# Patient Record
Sex: MALE | Race: WHITE | NOT HISPANIC OR LATINO | Employment: FULL TIME | URBAN - METROPOLITAN AREA
[De-identification: names, ages, dates, MRNs, and addresses within clinical notes are randomized per-mention and may not be internally consistent; named-entity substitution may affect disease eponyms.]

---
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
## 2021-01-19 ENCOUNTER — HOSPITAL ENCOUNTER (EMERGENCY)
Facility: HOSPITAL | Age: 21
Discharge: HOME | End: 2021-01-19
Attending: EMERGENCY MEDICINE
Payer: MEDICAID

## 2021-01-19 VITALS
HEIGHT: 74 IN | DIASTOLIC BLOOD PRESSURE: 80 MMHG | TEMPERATURE: 99 F | OXYGEN SATURATION: 99 % | WEIGHT: 205 LBS | RESPIRATION RATE: 14 BRPM | SYSTOLIC BLOOD PRESSURE: 139 MMHG | HEART RATE: 77 BPM | BODY MASS INDEX: 26.31 KG/M2

## 2021-01-19 DIAGNOSIS — J02.9 PHARYNGITIS, UNSPECIFIED ETIOLOGY: ICD-10-CM

## 2021-01-19 DIAGNOSIS — J35.8 SYMPTOMATIC TONSILLAR CRYPT: Primary | ICD-10-CM

## 2021-01-19 PROCEDURE — 99283 EMERGENCY DEPT VISIT LOW MDM: CPT | Mod: ER

## 2021-01-19 RX ORDER — AMOXICILLIN AND CLAVULANATE POTASSIUM 875; 125 MG/1; MG/1
1 TABLET, FILM COATED ORAL 2 TIMES DAILY
Qty: 14 TABLET | Refills: 0 | OUTPATIENT
Start: 2021-01-19 | End: 2024-02-23

## 2024-02-23 ENCOUNTER — HOSPITAL ENCOUNTER (EMERGENCY)
Facility: HOSPITAL | Age: 24
Discharge: HOME | End: 2024-02-23
Attending: EMERGENCY MEDICINE
Payer: COMMERCIAL

## 2024-02-23 VITALS
BODY MASS INDEX: 28.89 KG/M2 | RESPIRATION RATE: 20 BRPM | OXYGEN SATURATION: 100 % | HEART RATE: 68 BPM | TEMPERATURE: 98 F | DIASTOLIC BLOOD PRESSURE: 74 MMHG | SYSTOLIC BLOOD PRESSURE: 136 MMHG | WEIGHT: 225 LBS

## 2024-02-23 DIAGNOSIS — T14.8XXA PUNCTURE WOUND: Primary | ICD-10-CM

## 2024-02-23 PROCEDURE — 90715 TDAP VACCINE 7 YRS/> IM: CPT | Performed by: NURSE PRACTITIONER

## 2024-02-23 PROCEDURE — 63600175 PHARM REV CODE 636 W HCPCS: Performed by: NURSE PRACTITIONER

## 2024-02-23 PROCEDURE — 25000003 PHARM REV CODE 250: Performed by: NURSE PRACTITIONER

## 2024-02-23 PROCEDURE — 90471 IMMUNIZATION ADMIN: CPT | Performed by: NURSE PRACTITIONER

## 2024-02-23 PROCEDURE — 99284 EMERGENCY DEPT VISIT MOD MDM: CPT | Mod: 25

## 2024-02-23 RX ORDER — IBUPROFEN 600 MG/1
600 TABLET ORAL EVERY 6 HOURS PRN
Qty: 20 TABLET | Refills: 0 | Status: SHIPPED | OUTPATIENT
Start: 2024-02-23 | End: 2024-02-28

## 2024-02-23 RX ORDER — DEXTROMETHORPHAN HYDROBROMIDE, GUAIFENESIN 5; 100 MG/5ML; MG/5ML
650 LIQUID ORAL EVERY 8 HOURS
Qty: 20 TABLET | Refills: 0 | Status: SHIPPED | OUTPATIENT
Start: 2024-02-23 | End: 2024-03-01

## 2024-02-23 RX ORDER — MUPIROCIN 20 MG/G
OINTMENT TOPICAL 3 TIMES DAILY
Qty: 30 G | Refills: 0 | Status: SHIPPED | OUTPATIENT
Start: 2024-02-23 | End: 2024-03-08

## 2024-02-23 RX ORDER — CEPHALEXIN 500 MG/1
500 CAPSULE ORAL 4 TIMES DAILY
Qty: 20 CAPSULE | Refills: 0 | Status: SHIPPED | OUTPATIENT
Start: 2024-02-23 | End: 2024-02-28

## 2024-02-23 RX ORDER — MUPIROCIN 20 MG/G
OINTMENT TOPICAL
Status: COMPLETED | OUTPATIENT
Start: 2024-02-23 | End: 2024-02-23

## 2024-02-23 RX ADMIN — MUPIROCIN: 20 OINTMENT TOPICAL at 06:02

## 2024-02-23 RX ADMIN — TETANUS TOXOID, REDUCED DIPHTHERIA TOXOID AND ACELLULAR PERTUSSIS VACCINE, ADSORBED 0.5 ML: 5; 2.5; 8; 8; 2.5 SUSPENSION INTRAMUSCULAR at 06:02

## 2024-02-23 NOTE — FIRST PROVIDER EVALUATION
Medical screening examination initiated.  I have conducted a focused provider triage encounter, findings are as follows:    Brief history of present illness:  Kneeled on a nail at work with right knee; needs Tetanus    Vitals:    02/23/24 1728   BP: (!) 141/71   BP Location: Right arm   Patient Position: Sitting   Pulse: 98   Resp: 18   Temp: 98.5 °F (36.9 °C)   TempSrc: Oral   SpO2: 98%   Weight: 102.1 kg (225 lb)       Pertinent physical exam:  NAD    Brief workup plan:  [facility], xray    Preliminary workup initiated; this workup will be continued and followed by the physician or advanced practice provider that is assigned to the patient when roomed.

## 2024-02-23 NOTE — Clinical Note
"Maximiliano Woomatthew Garay was seen and treated in our emergency department on 2/23/2024.  He may return to work on 02/26/2024.       If you have any questions or concerns, please don't hesitate to call.      Lilibeth Mckeon, FNP"

## 2024-02-24 NOTE — ED PROVIDER NOTES
Encounter Date: 2/23/2024       History     Chief Complaint   Patient presents with    Knee Injury     Pt reports to the ED with C/O right knee pain S/P kneeling on a nail while at work. 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 Pt has a puncture santa to the right knee. Pt has full movement to the knee. Pt place in lobby awaiting  bed.      23 y.o. male with no pertinent PMH who presents to the Emergency Department with complaints of puncture wound to right knee.  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  Unsure of last tetanus.  No additional injuries.  He denies rash, fever, chest pain, SOB, numbness, weakness, tingling, abdominal pain, back pain, dysuria, hematuria, nausea, vomiting, diarrhea, or any other complaints.   He rates the pain as 4/10 and has not taken any medications for the symptoms.  No Alleviating/aggravating factors.                The history is provided by the patient.     Review of patient's allergies indicates:  No Known Allergies  Past Medical History:   Diagnosis Date    Laceration of digital nerve of finger     Tendon tear 08/19/2017    right long finger     Past Surgical History:   Procedure Laterality Date    HAND LIGAMENT RECONSTRUCTION Right 2013    Right thumb    TENDON REPAIR Right     long finger     History reviewed. No pertinent family history.  Social History     Tobacco Use    Smoking status: Never     Passive exposure: Never    Smokeless tobacco: Never   Substance Use Topics    Alcohol use: No    Drug use: Never     Review of Systems   Constitutional:  Negative for chills and fever.   HENT:  Negative for congestion, ear pain, rhinorrhea and sore throat.    Eyes:  Negative for pain, discharge and redness.   Respiratory:  Negative for cough and shortness of breath.    Cardiovascular:  Negative for chest pain.   Gastrointestinal:  Negative for abdominal pain, diarrhea, nausea and vomiting.    Genitourinary:  Negative for dysuria.   Musculoskeletal:  Negative for back pain, neck pain and neck stiffness.   Skin:  Positive for wound. Negative for rash.   Neurological:  Negative for dizziness, weakness, light-headedness, numbness and headaches.   Psychiatric/Behavioral:  Negative for confusion.        Physical Exam     Initial Vitals [02/23/24 1728]   BP Pulse Resp Temp SpO2   (!) 141/71 98 18 98.5 °F (36.9 °C) 98 %      MAP       --         Physical Exam    Nursing note and vitals reviewed.  Constitutional: He appears well-developed. He is cooperative.  Non-toxic appearance. He does not appear ill.   HENT:   Head: Normocephalic and atraumatic.   Right Ear: External ear normal.   Left Ear: External ear normal.   Nose: Nose normal.   Mouth/Throat: Oropharynx is clear and moist.   Eyes: Conjunctivae are normal.   Neck:   Normal range of motion.  Cardiovascular:  Normal rate and regular rhythm.           Pulmonary/Chest: Effort normal and breath sounds normal.   Abdominal: Abdomen is soft. There is no abdominal tenderness.   Musculoskeletal:      Cervical back: Normal range of motion.     Neurological: He is alert and oriented to person, place, and time. GCS eye subscore is 4. GCS verbal subscore is 5. GCS motor subscore is 6.   Skin: Skin is warm, dry and intact. No rash noted.   Puncture wound to right knee with mild erythema; no swelling; normal ROM, strength, and sensation; +2 DP/PT pulses; no drainage; no induration or fluctuance   Psychiatric: He has a normal mood and affect. His speech is normal and behavior is normal. Thought content normal.         ED Course   Procedures  Labs Reviewed - No data to display       Imaging Results              X-Ray Knee 3 View Right (Final result)  Result time 02/23/24 18:30:42      Final result by Isis Vázquez MD (02/23/24 18:30:42)                   Impression:      No acute osseous abnormality identified.      Electronically signed by: Isis Vázquez  MD  Date:    02/23/2024  Time:    18:30               Narrative:    EXAMINATION:  XR KNEE 3 VIEW RIGHT    CLINICAL HISTORY:  Other injury of unspecified body region, initial encounter    TECHNIQUE:  AP, lateral, and Merchant views of the right knee were performed.    COMPARISON:  None    FINDINGS:  No evidence of acute displaced fracture, dislocation, or osseous destructive process.  Joint spaces are preserved.  No significant suprapatellar joint effusion.                                       Medications   Tdap (BOOSTRIX) vaccine injection 0.5 mL (0.5 mLs Intramuscular Given 2/23/24 1856)   mupirocin 2 % ointment ( Topical (Top) Given 2/23/24 1856)     Medical Decision Making  This is an evaluation of a 23 y.o. male that presents to the Emergency Department for left knee injury with puncture wound.   The patient is a non-toxic, afebrile, and well appearing male. On physical exam, there is Puncture wound to right knee with mild erythema; no swelling; normal ROM, strength, and sensation; +2 DP/PT pulses; no drainage; no induration or fluctuance       Vital Signs: 141/71, 98.5, 98, 18, 98%   If available, previous records reviewed.   I ordered X-rays and reviewed the radiologist interpretation.  Xray significant for no acute process, no FB      My overall impression is puncture wound right knee.  DDX: FB, fracture, dislocation, laceration, cellulitis, septic joint, Gout.    During his stay in the ED, the patient has been given tetanus, mupirocin with good relief of his symptoms. The patient will be discharged home with Keflex, mupirocin. Additional home care recommendations include Tylenol/Ibuprofen, Hydration. The diagnosis, treatment plan, instructions for follow-up, strict return precautions, and reevaluation with his PCP as well as ED return precautions have been discussed with the patient and he has verbalized an understanding of the information.  All questions or concerns from the patient have been addressed.        Amount and/or Complexity of Data Reviewed  Radiology: ordered. Decision-making details documented in ED Course.    Risk  Prescription drug management.                                      Clinical Impression:  Final diagnoses:  [T14.8XXA] Puncture wound - right knee (Primary)          ED Disposition Condition    Discharge Stable          ED Prescriptions       Medication Sig Dispense Start Date End Date Auth. Provider    cephALEXin (KEFLEX) 500 MG capsule Take 1 capsule (500 mg total) by mouth 4 (four) times daily. for 5 days 20 capsule 2/23/2024 2/28/2024 Lilibeth Mckeon FNP    ibuprofen (ADVIL,MOTRIN) 600 MG tablet Take 1 tablet (600 mg total) by mouth every 6 (six) hours as needed for Pain. 20 tablet 2/23/2024 2/28/2024 Lilibeth Mckeon FNP    acetaminophen (TYLENOL) 650 MG TbSR Take 1 tablet (650 mg total) by mouth every 8 (eight) hours. for 7 days 20 tablet 2/23/2024 3/1/2024 Lilibeth Mckeon FNP    mupirocin (BACTROBAN) 2 % ointment Apply topically 3 (three) times daily. for 14 days 30 g 2/23/2024 3/8/2024 Lilibeth Mckeon FNP          Follow-up Information       Follow up With Specialties Details Why Contact Info    [facility] -  Schedule an appointment as soon as possible for a visit in 2 days  [address] [place] [address]  [phone]      [facility] Emergency Medicine Go to  If symptoms worsen [address] [facility] [place] [id number] [phone]             Lilibeth Mckeon FNP  02/23/24 4541

## 2024-02-24 NOTE — DISCHARGE INSTRUCTIONS

## (undated) DEVICE — SOL 9P NACL IRR PIC IL

## (undated) DEVICE — GAUZE SPONGE BULKEE 6X6.75IN

## (undated) DEVICE — PAD UNDERPAD 30X30

## (undated) DEVICE — GLOVE BIOGEL SKINSENSE PI 8.0

## (undated) DEVICE — CORD FOR BIPOLAR FORCEPS 12

## (undated) DEVICE — SCRUB HIBICLENS 4% CHG 4OZ

## (undated) DEVICE — DRESSING N ADH OIL EMUL 3X8

## (undated) DEVICE — SCRUB 10% POVIDONE IODINE 4OZ

## (undated) DEVICE — BUCKET PLASTER DISPOSABLE

## (undated) DEVICE — SOL PVP-I SCRUB 7.5% 4OZ

## (undated) DEVICE — SPLINT PLASTER FAST SET 5X30IN

## (undated) DEVICE — SET EXTENSION 30 IN W/LL ROLLE

## (undated) DEVICE — SEE MEDLINE ITEM 152515

## (undated) DEVICE — SET DECANTER MEDICHOICE

## (undated) DEVICE — TOURNIQUET SB QC SP 18X4IN

## (undated) DEVICE — Device

## (undated) DEVICE — GAUZE SPONGE 4X4 12PLY

## (undated) DEVICE — BANDAGE DERMACEA STRETCH 4X1IN

## (undated) DEVICE — PAD ABD 8X10 STERILE

## (undated) DEVICE — ELECTRODE REM PLYHSV RETURN 9

## (undated) DEVICE — PAD CAST SPECIALIST STRL 4

## (undated) DEVICE — SUT ET GRN BR 4-0 CR 36 SH1

## (undated) DEVICE — SUT ETHILON 8-0 TG100-8 TG1

## (undated) DEVICE — GLOVE BIOGEL SKINSENSE PI 6.5

## (undated) DEVICE — PADDING CAST SPECIALIST 3X4YD

## (undated) DEVICE — SUT PROLENE 6-0 30 RB-2

## (undated) DEVICE — SEE MEDLINE ITEM 146298

## (undated) DEVICE — SLING ARM X-LARGE FOAM STRAP

## (undated) DEVICE — NDL MICRODISSECTION 3CM 3/32

## (undated) DEVICE — GLOVE BIOGEL SKINSENSE PI 7.0

## (undated) DEVICE — SEE MEDLINE ITEM 152622

## (undated) DEVICE — SUT VICRYL 3-0 27 SH

## (undated) DEVICE — SLING ARM LARGE FOAM STRAP

## (undated) DEVICE — CLOSURE SKIN STERI STRIP 1/2X4

## (undated) DEVICE — SUT 5/0 18IN PLAIN GUT D/A

## (undated) DEVICE — SUT 4-0 CHROMIC GUT / P-3

## (undated) DEVICE — SUT ET GRN BR 3-0 CR 36 SH1